# Patient Record
Sex: FEMALE | URBAN - METROPOLITAN AREA
[De-identification: names, ages, dates, MRNs, and addresses within clinical notes are randomized per-mention and may not be internally consistent; named-entity substitution may affect disease eponyms.]

---

## 2019-07-20 ENCOUNTER — NURSE TRIAGE (OUTPATIENT)
Dept: NURSING | Facility: CLINIC | Age: 69
End: 2019-07-20

## 2019-07-20 NOTE — TELEPHONE ENCOUNTER
Caller states she has an infection that has speaded all over her body from her head, neck arms, hands foot. Caller denies any fever. Caller states she has not been diagnosed with an all over infection but know she has one. Caller rates pain all over 7/10 and has not taking anything for pain. Triage guidelines recommend to go to ED. Caller verbalized and understands directives.    Reason for Disposition    Nursing judgment or information in reference    Additional Information    Negative: Nursing judgment, per information in Reference    Negative: Information only call about a Well Adult (no illness or injury)    Protocols used: NO GUIDELINE [id number]